# Patient Record
Sex: FEMALE | Race: WHITE | Employment: UNEMPLOYED | URBAN - METROPOLITAN AREA
[De-identification: names, ages, dates, MRNs, and addresses within clinical notes are randomized per-mention and may not be internally consistent; named-entity substitution may affect disease eponyms.]

---

## 2022-12-01 ENCOUNTER — APPOINTMENT (OUTPATIENT)
Dept: GENERAL RADIOLOGY | Age: 37
End: 2022-12-01
Payer: COMMERCIAL

## 2022-12-01 ENCOUNTER — HOSPITAL ENCOUNTER (EMERGENCY)
Age: 37
Discharge: HOME | End: 2022-12-01
Attending: EMERGENCY MEDICINE
Payer: COMMERCIAL

## 2022-12-01 VITALS
DIASTOLIC BLOOD PRESSURE: 79 MMHG | OXYGEN SATURATION: 99 % | RESPIRATION RATE: 20 BRPM | HEART RATE: 110 BPM | SYSTOLIC BLOOD PRESSURE: 150 MMHG | TEMPERATURE: 99.1 F

## 2022-12-01 DIAGNOSIS — S93.401A SPRAIN OF RIGHT ANKLE, UNSPECIFIED LIGAMENT, INITIAL ENCOUNTER: Primary | ICD-10-CM

## 2022-12-01 PROCEDURE — 73630 X-RAY EXAM OF FOOT: CPT

## 2022-12-01 PROCEDURE — 99284 EMERGENCY DEPT VISIT MOD MDM: CPT

## 2022-12-01 PROCEDURE — 6360000002 HC RX W HCPCS: Performed by: STUDENT IN AN ORGANIZED HEALTH CARE EDUCATION/TRAINING PROGRAM

## 2022-12-01 PROCEDURE — 96372 THER/PROPH/DIAG INJ SC/IM: CPT

## 2022-12-01 PROCEDURE — 73610 X-RAY EXAM OF ANKLE: CPT

## 2022-12-01 RX ORDER — KETOROLAC TROMETHAMINE 30 MG/ML
30 INJECTION, SOLUTION INTRAMUSCULAR; INTRAVENOUS ONCE
Status: COMPLETED | OUTPATIENT
Start: 2022-12-01 | End: 2022-12-01

## 2022-12-01 RX ORDER — NAPROXEN 375 MG/1
375 TABLET ORAL 2 TIMES DAILY WITH MEALS
Qty: 20 TABLET | Refills: 0 | Status: SHIPPED | OUTPATIENT
Start: 2022-12-01

## 2022-12-01 RX ADMIN — KETOROLAC TROMETHAMINE 30 MG: 30 INJECTION, SOLUTION INTRAMUSCULAR at 20:56

## 2022-12-01 ASSESSMENT — ENCOUNTER SYMPTOMS
VOMITING: 0
NAUSEA: 0
RECTAL PAIN: 0
APNEA: 0
ABDOMINAL DISTENTION: 0
COLOR CHANGE: 0
SHORTNESS OF BREATH: 0
STRIDOR: 0
TROUBLE SWALLOWING: 0
EYES NEGATIVE: 1
VOICE CHANGE: 0
BACK PAIN: 0

## 2022-12-01 ASSESSMENT — PAIN - FUNCTIONAL ASSESSMENT: PAIN_FUNCTIONAL_ASSESSMENT: 0-10

## 2022-12-02 NOTE — ED TRIAGE NOTES
41 yo female arrived via [facility] [place] after slipping down 2 steps and rolling right ankle. Pt denies hitting head and/or LOC. Pt A&O x 4, does not appear in acute distress, RR even and unlabored, resting comfortably on stretcher with eyes open and call light in reach. Vital signs obtained, medical hx and allergies reviewed with pt. Initial assessment performed by physician, [facility] will carry out initial orders/tasks and reassess pt.

## 2022-12-02 NOTE — DISCHARGE INSTRUCTIONS
Call today or tomorrow to follow up with Brooke Thomas  in 1-5 days. Use an ice pack or bag filled with ice and apply to the injured area 3 - 4 times a day for 15 - 20 minutes each time. Use ibuprofen or Tylenol (unless prescribed medications that have Tylenol in it) for pain. You can take over the counter Ibuprofen (advil) tablets (4 every 8 hours or 3 every 6 hours or 2 every 4 hours)    Use your crutches for the next several days until you are able to take 10 steps without pain. Return to the Emergency Department for worsening of pain, increase swelling to the ankle, inability to move your toes, any other care or concern.

## 2022-12-02 NOTE — PROGRESS NOTES
[facility]  PROGRESS NOTE    Shift date: 12.2.2022  Shift day: Thursday   Shift # 2    Room # 11/11   Name: Michelle Davis                Jewish: unknown   Place of Baptism: unknown    Referral: Routine Visit    Admit Date & Time: 12/1/2022  8:37 PM    Assessment:  Michelle Davis is a 42 y.o. female in the hospital. Upon entering the room writer observes patient and family to be calm and coping. Intervention:  Writer introduced self and title as  Writer offered space for the patient and family  to express feelings, needs, and concerns and provided a ministry presence. Determined support to be available. Outcome:  Patient and family appear calm and coping. Plan:  Chaplains will remain available to offer spiritual and emotional support as needed.       Electronically signed by Debbi Solomon on 12/2/2022 at 1:32 AM.  [facility]  [phone]       12/01/22 2050   Encounter Summary   Service Provided For: Patient and family together   Referral/Consult From: [facility] Family members   Last Encounter  12/01/22   Complexity of Encounter Moderate   Begin Time 2050   End Time  2105   Total Time Calculated 15 min   Encounter    Type Initial Screen/Assessment   Assessment/Intervention/Outcome   Assessment Calm;Coping   Intervention Active listening;Explored/Affirmed feelings, thoughts, concerns   Outcome Expressed feelings, needs, and concerns;Expressed Gratitude     Electronically signed by Nona Figueroa on 12/2/2022 at 1:33 AM

## 2022-12-02 NOTE — ED PROVIDER NOTES
[facility]  ED  Emergency Department Encounter  Emergency Medicine Resident     Pt Name: Hyun Euceda  JKH:[id number]  [place] 1985  Date of evaluation: 12/1/22  PCP:  [facility]       Chief Complaint   Patient presents with    Ankle Pain     Right ankle paiun. States slipped down the last 2 steps and rolled ankle        HISTORY OF PRESENT ILLNESS  (Location/Symptom, Timing/Onset, Context/Setting, Quality, Duration, ModifyingFactors, Severity.)      Hyun Euceda is a 42 y.o. female presents to the emergency department for evaluation of right ankle pain. Patient slipped down approximately 2 steps and rolled her ankle significantly. Patient was unable to bear weight after and continues any unable to bear weight at this time. Patient complaining about significant pain over the lateral malleolus as well as on the base of the fifth metatarsal.  Patient states that nothing unable to alleviate her pain and that she still cannot ambulate. PAST MEDICAL / SURGICAL / SOCIAL /FAMILY HISTORY      has no past medical history on file. No other pertinent PMH on review with patient/guardian. has no past surgical history on file. No other pertinent PSH on review with patient/guardian.   Social History     Socioeconomic History    Marital status:      Spouse name: Not on file    Number of children: Not on file    Years of education: Not on file    Highest education level: Not on file   Occupational History    Not on file   Tobacco Use    Smoking status: Never    Smokeless tobacco: Never   Vaping Use    Vaping Use: Never used   Substance and Sexual Activity    Alcohol use: Never    Drug use: Never    Sexual activity: Not on file   Other Topics Concern    Not on file   Social History Narrative    Not on file     Social Determinants of Health     Financial Resource Strain: Not on file   Food Insecurity: Not on file   Transportation Needs: Not on file   Physical Activity: Not on file   Stress: Not on file   Social Connections: Not on file   Intimate Partner Violence: Not on file   Housing Stability: Not on file       I counseled the patient against using tobacco products. History reviewed. No pertinent family history. No other pertinent FamHx on review with patient/guardian. Allergies:  Ceclor [cefaclor] and Penicillins    Home Medications:  Prior to Admission medications    Medication Sig Start Date End Date Taking? Authorizing Provider   naproxen (NAPROSYN) 375 MG tablet Take 1 tablet by mouth 2 times daily (with meals) 12/1/22  Yes Elham Temple MD   ARTHRITIS PAIN RELIEVER 650 MG CR tablet  2/10/16   Historical Provider, MD   busPIRone (BUSPAR) 7.5 MG tablet  2/24/16   Historical Provider, MD   vitamin D (CHOLECALCIFEROL) 1000 UNIT TABS tablet  2/8/16   Historical Provider, MD   citalopram (CELEXA) 20 MG tablet  12/8/15   Historical Provider, MD   vitamin D (ERGOCALCIFEROL) 48480 UNITS CAPS capsule  2/8/16   Historical Provider, MD   topiramate (TOPAMAX) 25 MG tablet  1/17/16   Historical Provider, MD   albuterol sulfate HFA (PROVENTIL HFA) 108 (90 BASE) MCG/ACT inhaler Inhale 1-2 puffs into the lungs every 4 hours as needed for Wheezing or Shortness of Breath (Space out to every 6 hours as symptoms improve) Space out to every 6 hours as symptoms improve. 3/6/16   MIHAELA Bailey - CNP       REVIEW OF SYSTEMS    (2-9 systems for level 4, 10 ormore for level 5)      Review of Systems   Constitutional:  Negative for activity change and fever. HENT:  Negative for congestion, tinnitus, trouble swallowing and voice change. Eyes: Negative. Respiratory:  Negative for apnea, shortness of breath and stridor. Cardiovascular:  Negative for chest pain and palpitations. Gastrointestinal:  Negative for abdominal distention, nausea, rectal pain and vomiting. Endocrine: Negative for cold intolerance and heat intolerance.    Genitourinary:  Negative for difficulty urinating, frequency and urgency. Musculoskeletal:  Positive for joint swelling. Negative for arthralgias and back pain. Skin:  Negative for color change, pallor, rash and wound. Allergic/Immunologic: Negative for immunocompromised state. Neurological:  Negative for dizziness, facial asymmetry and headaches. Psychiatric/Behavioral:  Negative for agitation, behavioral problems and confusion. PHYSICAL EXAM   (up to 7 for level 4, 8 or more for level 5)      INITIAL VITALS:   BP (!) 150/79   Pulse (!) 110   Temp 99.1 °F (37.3 °C) (Oral)   Resp 20   SpO2 99%     Physical Exam  Vitals reviewed. Constitutional:       Appearance: Normal appearance. She is obese. HENT:      Head: Normocephalic and atraumatic. Nose: Nose normal.      Mouth/Throat:      Mouth: Mucous membranes are moist.      Pharynx: Oropharynx is clear. Eyes:      Extraocular Movements: Extraocular movements intact. Conjunctiva/sclera: Conjunctivae normal.      Pupils: Pupils are equal, round, and reactive to light. Cardiovascular:      Rate and Rhythm: Normal rate and regular rhythm. Pulses: Normal pulses. Heart sounds: Normal heart sounds. Pulmonary:      Effort: Pulmonary effort is normal.      Breath sounds: Normal breath sounds. Musculoskeletal:      Cervical back: Normal range of motion and neck supple. Comments: Significant point tenderness to the lateral malleolus as well as the base of the fifth metatarsal.  No navicular pain, no Achilles tendon pain. Range of motion limited secondary to body habitus and pain   Skin:     General: Skin is warm and dry. Capillary Refill: Capillary refill takes less than 2 seconds. Neurological:      General: No focal deficit present. Mental Status: She is alert. Mental status is at baseline.    Psychiatric:         Mood and Affect: Mood normal.         Behavior: Behavior normal.       DIFFERENTIAL  DIAGNOSIS     DDX: Sprain, ankle fracture, Reza fracture, pseudo Reza    PLAN (LABS / IMAGING / EKG):  Orders Placed This Encounter   Procedures    XR ANKLE RIGHT (MIN 3 VIEWS)    XR FOOT RIGHT (MIN 3 VIEWS)    [employer] Crutches; Pair, Right Side Injury; Med (5'2\"-5'10\"); Cornelious Scales, Air Select Hi Top, Right; MD (M7-10/F8-11)       MEDICATIONS ORDERED:  Orders Placed This Encounter   Medications    ketorolac (TORADOL) injection 30 mg    naproxen (NAPROSYN) 375 MG tablet     Sig: Take 1 tablet by mouth 2 times daily (with meals)     Dispense:  20 tablet     Refill:  0           DIAGNOSTIC RESULTS / EMERGENCY DEPARTMENT COURSE / MDM     LABS:  No results found for this visit on 12/01/22. IMPRESSION/MDM/ED COURSE:  42 y.o. female presented with significant pain in her right ankle and foot. We will get x-rays of both foot and ankle for rule out of fractures or other pathologies. Will pain control with Toradol and reevaluate after scans have returned    ED Course as of 12/01/22 2233   Thu Dec 01, 2022   2233 Patient has no fracture of the foot or the ankle. We will give a walker boot as well as crutches and discharge the patient. [ES]      ED Course User Index  [ES] Elham Temple MD        Patient/Guardian requesting discharge. Patient/Guardian was given written and verbal instructions prior to discharge. Patient/Guardian understood and agreed. Patient/Guardian had no further questions. RADIOLOGY:  XR ANKLE RIGHT (MIN 3 VIEWS)   Final Result   Mild lateral malleolar soft tissue swelling but otherwise unremarkable right   foot and right ankle. XR FOOT RIGHT (MIN 3 VIEWS)   Final Result   Mild lateral malleolar soft tissue swelling but otherwise unremarkable right   foot and right ankle.                EKG  None    All EKG's are interpreted by the Emergency Department Physician who either signs or Co-signs this chart in the absence of a cardiologist.      PROCEDURES:  None    CONSULTS:  None        FINAL IMPRESSION      1.  Sprain of right ankle, unspecified ligament, initial encounter          DISPOSITION / PLAN       DISPOSITION Decision To Discharge 12/01/2022 10:31:27 PM        PATIENT REFERREDTO:  Juan M Jin    Schedule an appointment as soon as possible for a visit       [facility] ED  [address] [place] [address]  [phone]  Go to   As needed    DISCHARGE MEDICATIONS:  New Prescriptions    No medications on file       Melanie Hilliard MD  PGY 3  Resident Physician Emergency Medicine  12/01/22 10:33 PM        (Please note that portions of this note were completed with a voice recognition program.Efforts were made to edit the dictations but occasionally words are mis-transcribed.)       Melanie Hilliard MD  Resident  12/01/22 1450

## 2022-12-17 NOTE — ED PROVIDER NOTES
[facility]  ED  eMERGENCY dEPARTMENT eNCOUnter   Attending Attestation     Pt Name: Cristin Blake  MRN: [id number]  [place] 1985  Date of evaluation: 12/01/22       Cristin Blake is a 42 y.o. female who presents with Ankle Pain (Right ankle paiun. States slipped down the last 2 steps and rolled ankle )          I performed a history and physical examination of the patient and discussed management with the resident. I reviewed the residents note and agree with the documented findings and plan of care. Any areas of disagreement are noted on the chart. I was personally present for the key portions of any procedures. I have documented in the chart those procedures where I was not present during the key portions. I have personally reviewed all images and agree with the resident's interpretation. I have reviewed the emergency nurses triage note. I agree with the chief complaint, past medical history, past surgical history, allergies, medications, social and family history as documented unless otherwise noted below. Documentation of the HPI, Physical Exam and Medical Decision Making performed by medical students or scribes is based on my personal performance of the HPI, PE and MDM. For Phys Assistant/ Nurse Practitioner cases/documentation I have had a face to face evaluation of this patient and have completed at least one if not all key elements of the E/M (history, physical exam, and MDM). Additional findings are as noted. For APC cases I have personally evaluated and examined the patient in conjunction with the APC and agree with the treatment plan and disposition of the patient as recorded by the APC.     Ankit Kwong MD  Attending Emergency  Physician       Janie Goyal MD  12/17/22 0914